# Patient Record
Sex: FEMALE | Race: WHITE
[De-identification: names, ages, dates, MRNs, and addresses within clinical notes are randomized per-mention and may not be internally consistent; named-entity substitution may affect disease eponyms.]

---

## 2017-03-24 ENCOUNTER — HOSPITAL ENCOUNTER (EMERGENCY)
Dept: HOSPITAL 10 - FTE | Age: 32
Discharge: HOME | End: 2017-03-24
Payer: COMMERCIAL

## 2017-03-24 VITALS
DIASTOLIC BLOOD PRESSURE: 59 MMHG | SYSTOLIC BLOOD PRESSURE: 118 MMHG | RESPIRATION RATE: 18 BRPM | TEMPERATURE: 98.1 F | HEART RATE: 76 BPM

## 2017-03-24 VITALS — WEIGHT: 158.95 LBS | BODY MASS INDEX: 36.79 KG/M2 | HEIGHT: 55 IN
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
## 2017-03-24 DIAGNOSIS — S80.11XA: ICD-10-CM

## 2017-03-24 DIAGNOSIS — S87.81XA: Primary | ICD-10-CM

## 2017-03-24 LAB
ADD SCAN DIFF: NO
ALBUMIN SERPL-MCNC: 4 G/DL (ref 3.3–4.9)
ALBUMIN/GLOB SERPL: 1.25 {RATIO}
ALP SERPL-CCNC: 107 IU/L (ref 42–121)
ALT SERPL-CCNC: 44 IU/L (ref 13–69)
ANION GAP SERPL CALC-SCNC: 20 MMOL/L (ref 8–16)
AST SERPL-CCNC: 46 IU/L (ref 15–46)
BASOPHILS # BLD AUTO: 0 10^3/UL (ref 0–0.1)
BASOPHILS NFR BLD: 0.4 % (ref 0–2)
BILIRUB DIRECT SERPL-MCNC: 0 MG/DL (ref 0–0.2)
BILIRUB SERPL-MCNC: 0.5 MG/DL (ref 0.2–1.3)
BUN SERPL-MCNC: 13 MG/DL (ref 7–20)
CALCIUM SERPL-MCNC: 9.2 MG/DL (ref 8.4–10.2)
CHLORIDE SERPL-SCNC: 102 MMOL/L (ref 97–110)
CO2 SERPL-SCNC: 23 MMOL/L (ref 21–31)
CREAT SERPL-MCNC: 0.69 MG/DL (ref 0.44–1)
EOSINOPHIL # BLD: 0.1 10^3/UL (ref 0–0.5)
EOSINOPHIL NFR BLD: 1.1 % (ref 0–7)
ERYTHROCYTE [DISTWIDTH] IN BLOOD BY AUTOMATED COUNT: 13.5 % (ref 11.5–14.5)
GLOBULIN SER-MCNC: 3.2 G/DL (ref 1.3–3.2)
GLUCOSE SERPL-MCNC: 98 MG/DL (ref 70–220)
HCT VFR BLD CALC: 38.7 % (ref 37–47)
HGB BLD-MCNC: 12.9 G/DL (ref 12–16)
LYMPHOCYTES # BLD AUTO: 2.3 10^3/UL (ref 0.8–2.9)
LYMPHOCYTES NFR BLD AUTO: 24.7 % (ref 15–51)
MCH RBC QN AUTO: 28.9 PG (ref 29–33)
MCHC RBC AUTO-ENTMCNC: 33.3 G/DL (ref 32–37)
MCV RBC AUTO: 86.6 FL (ref 82–101)
MONOCYTES # BLD: 0.9 10^3/UL (ref 0.3–0.9)
MONOCYTES NFR BLD: 9.7 % (ref 0–11)
NEUTROPHILS # BLD: 5.9 10^3/UL (ref 1.6–7.5)
NEUTROPHILS NFR BLD AUTO: 63.5 % (ref 39–77)
NRBC # BLD MANUAL: 0 10^3/UL (ref 0–0)
NRBC BLD QL: 0 /100WBC (ref 0–0)
PLATELET # BLD: 279 10^3/UL (ref 140–415)
PMV BLD AUTO: 10.7 FL (ref 7.4–10.4)
POTASSIUM SERPL-SCNC: 4.7 MMOL/L (ref 3.5–5.1)
PROT SERPL-MCNC: 7.2 G/DL (ref 6.1–8.1)
RBC # BLD AUTO: 4.47 10^6/UL (ref 4.2–5.4)
SODIUM SERPL-SCNC: 140 MMOL/L (ref 135–144)
WBC # BLD AUTO: 9.3 10^3/UL (ref 4.8–10.8)

## 2017-03-24 PROCEDURE — 99285 EMERGENCY DEPT VISIT HI MDM: CPT

## 2017-03-24 PROCEDURE — 82550 ASSAY OF CK (CPK): CPT

## 2017-03-24 PROCEDURE — 93926 LOWER EXTREMITY STUDY: CPT

## 2017-03-24 PROCEDURE — 83605 ASSAY OF LACTIC ACID: CPT

## 2017-03-24 PROCEDURE — 73590 X-RAY EXAM OF LOWER LEG: CPT

## 2017-03-24 PROCEDURE — 85651 RBC SED RATE NONAUTOMATED: CPT

## 2017-03-24 PROCEDURE — 85025 COMPLETE CBC W/AUTO DIFF WBC: CPT

## 2017-03-24 PROCEDURE — 80053 COMPREHEN METABOLIC PANEL: CPT

## 2017-03-24 PROCEDURE — 73630 X-RAY EXAM OF FOOT: CPT

## 2017-03-24 PROCEDURE — 96374 THER/PROPH/DIAG INJ IV PUSH: CPT

## 2017-03-24 PROCEDURE — 73610 X-RAY EXAM OF ANKLE: CPT

## 2017-03-24 PROCEDURE — 96375 TX/PRO/DX INJ NEW DRUG ADDON: CPT

## 2017-03-24 PROCEDURE — 82553 CREATINE MB FRACTION: CPT

## 2017-03-24 PROCEDURE — 84484 ASSAY OF TROPONIN QUANT: CPT

## 2017-03-24 PROCEDURE — 93971 EXTREMITY STUDY: CPT

## 2017-03-24 PROCEDURE — 86140 C-REACTIVE PROTEIN: CPT

## 2017-03-24 NOTE — RADRPT
PROCEDURE:   XR right ankle. 

 

CLINICAL INDICATION:   Ankle pain 

 

TECHNIQUE:    Three views  are available for review. 

 

COMPARISON:   None available 

 

FINDINGS:

 

 

There is diffuse soft tissue swelling at the ankle.

There is normal mineralization, architecture and alignment. No fracture or osseous lesion is identif
ied.  The joints are unremarkable. 

 

 

IMPRESSION:

Diffuse soft tissue swelling. 

No fracture or osseous lesion identified 

 

RPTAT: DB

_____________________________________________ 

.Kareem Blackburn MD, MD           Date    Time 

Electronically viewed and signed by .Kareem Blackburn MD, MD on 03/24/2017 13:27 

 

D:  03/24/2017 13:27  T:  03/24/2017 13:27

.B/
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
## 2017-03-24 NOTE — EN
Date/Time of Note


Date/Time of Note


DATE: 3/24/17 


TIME: 17:18





ER Progress Note


Patient requested a foot x-ray, foot x-ray was done was evidence of fracture or 

dislocation.  Patient was given Percocet and ibuprofen for her pain.  Patient 

is to follow-up with her primary care doctor within 1-2 days and see an 

orthopedic doctor.  I shared my medical decision making with patient she 

understands and agrees with plan.











STEPHEN BURGOS Mar 24, 2017 17:19
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
## 2017-03-24 NOTE — RADRPT
PROCEDURE:   XR right tibia/fibula. 

 

CLINICAL INDICATION:   Leg pain 

 

TECHNIQUE:   AP and lateral views of the upper tibia / fibula available for review. 

 

COMPARISON:   None available 

 

FINDINGS:

 

The osseous structures are normal in mineralization, architecture and alignment.  No fractures are i
dentified.  No osseous lesions are identified.  The joints are unremarkable.  The soft tissues are u
nremarkable.  

 

IMPRESSION:

 

Unremarkable examination

 

 

RPTAT: HGDB

_____________________________________________ 

.Kareem Blackburn MD, MD           Date    Time 

Electronically viewed and signed by .Kareem Blackburn MD, MD on 03/24/2017 13:27 

 

D:  03/24/2017 13:27  T:  03/24/2017 13:27

.B/

## 2017-03-24 NOTE — RADRPT
PROCEDURE:   XR Foot. 

 

CLINICAL INDICATION:   Pain. 

 

TECHNIQUE:    Three views of the right foot.

 

COMPARISON:   None available. 

 

FINDINGS:

 

No fracture or dislocation is identified.  The joint spaces are preserved.  There is soft tissue swe
lling along the forefoot. 

 

IMPRESSION:

 

1.  No fracture or dislocation of the right foot.  

 

 

 

RPTAT: HTAR

_____________________________________________ 

.Adrian Thomas MD, MD           Date    Time 

Electronically viewed and signed by .Adrian Thomas MD, MD on 03/24/2017 17:10 

 

D:  03/24/2017 17:10  T:  03/24/2017 17:10

.R/

## 2017-09-18 ENCOUNTER — HOSPITAL ENCOUNTER (EMERGENCY)
Dept: HOSPITAL 10 - FTE | Age: 32
Discharge: HOME | End: 2017-09-18
Payer: COMMERCIAL

## 2017-09-18 VITALS
BODY MASS INDEX: 31.64 KG/M2 | BODY MASS INDEX: 31.64 KG/M2 | WEIGHT: 171.96 LBS | HEIGHT: 62 IN | HEIGHT: 62 IN | WEIGHT: 171.96 LBS

## 2017-09-18 VITALS — SYSTOLIC BLOOD PRESSURE: 148 MMHG | DIASTOLIC BLOOD PRESSURE: 79 MMHG | RESPIRATION RATE: 18 BRPM | HEART RATE: 88 BPM

## 2017-09-18 DIAGNOSIS — R07.2: Primary | ICD-10-CM

## 2017-09-18 LAB
ALBUMIN SERPL-MCNC: 4.4 G/DL (ref 3.3–4.9)
ALBUMIN/GLOB SERPL: 1.18 {RATIO}
ALP SERPL-CCNC: 93 IU/L (ref 42–121)
ALT SERPL-CCNC: 55 IU/L (ref 13–69)
ANION GAP SERPL CALC-SCNC: 17 MMOL/L (ref 8–16)
AST SERPL-CCNC: 78 IU/L (ref 15–46)
BASOPHILS # BLD AUTO: 0.1 10^3/UL (ref 0–0.1)
BASOPHILS NFR BLD: 0.3 % (ref 0–2)
BILIRUB DIRECT SERPL-MCNC: 0 MG/DL (ref 0–0.2)
BILIRUB SERPL-MCNC: 0.2 MG/DL (ref 0.2–1.3)
BUN SERPL-MCNC: 14 MG/DL (ref 7–20)
CALCIUM SERPL-MCNC: 9.5 MG/DL (ref 8.4–10.2)
CHLORIDE SERPL-SCNC: 104 MMOL/L (ref 97–110)
CO2 SERPL-SCNC: 24 MMOL/L (ref 21–31)
CREAT SERPL-MCNC: 0.72 MG/DL (ref 0.44–1)
EOSINOPHIL # BLD: 0.1 10^3/UL (ref 0–0.5)
EOSINOPHIL NFR BLD: 0.6 % (ref 0–7)
ERYTHROCYTE [DISTWIDTH] IN BLOOD BY AUTOMATED COUNT: 13.6 % (ref 11.5–14.5)
GLOBULIN SER-MCNC: 3.7 G/DL (ref 1.3–3.2)
GLUCOSE SERPL-MCNC: 108 MG/DL (ref 70–220)
HCT VFR BLD CALC: 42.3 % (ref 37–47)
HGB BLD-MCNC: 14.1 G/DL (ref 12–16)
LYMPHOCYTES # BLD AUTO: 2.5 10^3/UL (ref 0.8–2.9)
LYMPHOCYTES NFR BLD AUTO: 16.2 % (ref 15–51)
MCH RBC QN AUTO: 27.9 PG (ref 29–33)
MCHC RBC AUTO-ENTMCNC: 33.3 G/DL (ref 32–37)
MCV RBC AUTO: 83.8 FL (ref 82–101)
MONOCYTES # BLD: 0.8 10^3/UL (ref 0.3–0.9)
MONOCYTES NFR BLD: 5.4 % (ref 0–11)
NEUTROPHILS # BLD: 12 10^3/UL (ref 1.6–7.5)
NEUTROPHILS NFR BLD AUTO: 76.9 % (ref 39–77)
NRBC # BLD MANUAL: 0 10^3/UL (ref 0–0)
NRBC BLD AUTO-RTO: 0 /100WBC (ref 0–0)
PLATELET # BLD: 264 10^3/UL (ref 140–415)
PMV BLD AUTO: 11.5 FL (ref 7.4–10.4)
POTASSIUM SERPL-SCNC: 4.1 MMOL/L (ref 3.5–5.1)
PROT SERPL-MCNC: 8.1 G/DL (ref 6.1–8.1)
RBC # BLD AUTO: 5.05 10^6/UL (ref 4.2–5.4)
SODIUM SERPL-SCNC: 141 MMOL/L (ref 135–144)
TROPONIN I SERPL-MCNC: < 0.012 NG/ML (ref 0–0.12)
WBC # BLD AUTO: 15.7 10^3/UL (ref 4.8–10.8)

## 2017-09-18 PROCEDURE — 80053 COMPREHEN METABOLIC PANEL: CPT

## 2017-09-18 PROCEDURE — 96375 TX/PRO/DX INJ NEW DRUG ADDON: CPT

## 2017-09-18 PROCEDURE — 96374 THER/PROPH/DIAG INJ IV PUSH: CPT

## 2017-09-18 PROCEDURE — 76705 ECHO EXAM OF ABDOMEN: CPT

## 2017-09-18 PROCEDURE — 99285 EMERGENCY DEPT VISIT HI MDM: CPT

## 2017-09-18 PROCEDURE — 83690 ASSAY OF LIPASE: CPT

## 2017-09-18 PROCEDURE — 84484 ASSAY OF TROPONIN QUANT: CPT

## 2017-09-18 PROCEDURE — 71010: CPT

## 2017-09-18 PROCEDURE — 85025 COMPLETE CBC W/AUTO DIFF WBC: CPT

## 2017-09-18 PROCEDURE — 93005 ELECTROCARDIOGRAM TRACING: CPT

## 2017-09-18 PROCEDURE — 96376 TX/PRO/DX INJ SAME DRUG ADON: CPT

## 2017-09-18 NOTE — RADRPT
PROCEDURE:   Right upper quadrant abdominal ultrasound. 

 

CLINICAL INDICATION:  Abdominal pain

 

TECHNIQUE:   Gray scale and color doppler ultrasound images of the right upper quadrant of the abdom
en.

 

COMPARISON:   CT abdomen pelvis 06/10/2014 

 

FINDINGS:

 

Pancreas:  Visualized portions appear of normal echogenicity without  focal lesions.

 

Liver: 

Morphology:  The right lobe of the liver is elongated measuring up to 18.5 cm which may reflect Ried
el's lobe configuration. 

Contour:Normal, no evidence of nodularity.

Echogenicity: Increased

Focal lesions:None.

Main portal vein: Patent with hepatopetal flow.

 

Biliary System: 

Gallbladder wall: Normal thickness.

Gallstones: Numerous are present

Intrahepatic bile ducts: Normal caliber.

Common bile duct diameter (mm): 5.3

 

Kidneys:

Right length (cm) : 12.3

Right cortical thickness: Normal.

Echogenicity: Normal.

Hydronephrosis: None.

Renal calculi: None.

Focal lesions: None.

 

Free fluid/ascites:  None.

 

Abdominal aorta: Not visualized by the sonographer.

 

Other findings: None.

 

 

IMPRESSION:

Cholelithiasis without evidence of abnormal gallbladder wall thickening to suggest cholecystitis.

Normal caliber of the intrahepatic and extrahepatic biliary system.

Increased echogenicity of the liver parenchyma suggestive of hepatic steatosis.

 

RPTAT: AADD

_____________________________________________ 

.Ramón Gordillo MD, MD           Date    Time 

Electronically viewed and signed by .Ramón Gordillo MD, MD on 09/18/2017 09:03 

 

D:  09/18/2017 09:03  T:  09/18/2017 09:03

.B/
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
## 2017-09-18 NOTE — RADRPT
PROCEDURE:   XR Chest. 

 

CLINICAL INDICATION:     Chest pain

 

TECHNIQUE:   A single AP view of the chest was obtained.

 

COMPARISON:   Chest x-ray dated 03/27/2014 

 

FINDINGS:

 

 

 

No focal airspace opacification, pleural effusion or pneumothorax is seen.  The cardiomediastinal si
lhouette is within normal limits for size.  The osseous structures are unremarkable.   

 

IMPRESSION:

 

Unremarkable chest x-ray.    

 

 

RPTAT: HH

_____________________________________________ 

.Chelita Cisneros MD, MD           Date    Time 

Electronically viewed and signed by .Chelita Cisneros MD, MD on 09/18/2017 07:17 

 

D:  09/18/2017 07:17  T:  09/18/2017 07:17

.G/

## 2018-05-22 ENCOUNTER — HOSPITAL ENCOUNTER (EMERGENCY)
Dept: HOSPITAL 91 - FTE | Age: 33
Discharge: HOME | End: 2018-05-22
Payer: COMMERCIAL

## 2018-05-22 ENCOUNTER — HOSPITAL ENCOUNTER (EMERGENCY)
Age: 33
Discharge: HOME | End: 2018-05-22

## 2018-05-22 DIAGNOSIS — J06.9: ICD-10-CM

## 2018-05-22 DIAGNOSIS — T79.2XXA: Primary | ICD-10-CM

## 2018-05-22 PROCEDURE — 99284 EMERGENCY DEPT VISIT MOD MDM: CPT

## 2018-05-22 PROCEDURE — 81025 URINE PREGNANCY TEST: CPT

## 2018-05-22 PROCEDURE — 73590 X-RAY EXAM OF LOWER LEG: CPT
